# Patient Record
Sex: MALE | Race: OTHER | Employment: FULL TIME | URBAN - METROPOLITAN AREA
[De-identification: names, ages, dates, MRNs, and addresses within clinical notes are randomized per-mention and may not be internally consistent; named-entity substitution may affect disease eponyms.]

---

## 2017-03-22 PROCEDURE — 88305 TISSUE EXAM BY PATHOLOGIST: CPT | Performed by: INTERNAL MEDICINE

## 2017-03-23 PROBLEM — Z86.0100 HISTORY OF COLON POLYPS: Status: ACTIVE | Noted: 2017-03-23

## 2017-03-23 PROBLEM — Z86.010 HISTORY OF COLON POLYPS: Status: ACTIVE | Noted: 2017-03-23

## 2017-04-10 PROBLEM — H25.13 NUCLEAR SCLEROTIC CATARACT, BILATERAL: Status: ACTIVE | Noted: 2017-04-10

## 2017-04-10 PROBLEM — H17.9 CORNEAL SCARS, BOTH EYES: Status: ACTIVE | Noted: 2017-04-10

## 2017-04-10 PROBLEM — H26.9 CORTICAL CATARACT OF BOTH EYES: Status: ACTIVE | Noted: 2017-04-10

## 2018-02-13 PROBLEM — E66.3 OVERWEIGHT (BMI 25.0-29.9): Status: ACTIVE | Noted: 2018-02-13

## 2018-02-13 PROCEDURE — 84153 ASSAY OF PSA TOTAL: CPT | Performed by: INTERNAL MEDICINE

## 2018-06-10 PROCEDURE — 87086 URINE CULTURE/COLONY COUNT: CPT | Performed by: INTERNAL MEDICINE

## 2018-07-06 PROCEDURE — 87086 URINE CULTURE/COLONY COUNT: CPT | Performed by: INTERNAL MEDICINE

## 2018-07-30 PROCEDURE — 87086 URINE CULTURE/COLONY COUNT: CPT | Performed by: UROLOGY

## 2018-08-21 PROCEDURE — 88305 TISSUE EXAM BY PATHOLOGIST: CPT | Performed by: UROLOGY

## 2018-09-16 ENCOUNTER — APPOINTMENT (OUTPATIENT)
Dept: CT IMAGING | Facility: HOSPITAL | Age: 63
End: 2018-09-16
Attending: EMERGENCY MEDICINE
Payer: COMMERCIAL

## 2018-09-16 ENCOUNTER — HOSPITAL ENCOUNTER (OUTPATIENT)
Facility: HOSPITAL | Age: 63
Setting detail: OBSERVATION
Discharge: HOME | End: 2018-09-17
Attending: EMERGENCY MEDICINE | Admitting: INTERNAL MEDICINE
Payer: COMMERCIAL

## 2018-09-16 DIAGNOSIS — R31.9 HEMATURIA, UNSPECIFIED TYPE: Primary | ICD-10-CM

## 2018-09-16 PROBLEM — R73.9 HYPERGLYCEMIA: Status: ACTIVE | Noted: 2018-09-16

## 2018-09-16 LAB
ALBUMIN SERPL-MCNC: 3.7 G/DL (ref 3.5–4.8)
ALBUMIN/GLOB SERPL: 0.9 {RATIO} (ref 1–2)
ALP LIVER SERPL-CCNC: 79 U/L (ref 45–117)
ALT SERPL-CCNC: 18 U/L (ref 17–63)
ANION GAP SERPL CALC-SCNC: 6 MMOL/L (ref 0–18)
AST SERPL-CCNC: 13 U/L (ref 15–41)
BASOPHILS # BLD AUTO: 0.05 X10(3) UL (ref 0–0.1)
BASOPHILS NFR BLD AUTO: 0.5 %
BILIRUB SERPL-MCNC: 0.4 MG/DL (ref 0.1–2)
BUN BLD-MCNC: 13 MG/DL (ref 8–20)
BUN/CREAT SERPL: 13.1 (ref 10–20)
CALCIUM BLD-MCNC: 8.8 MG/DL (ref 8.3–10.3)
CHLORIDE SERPL-SCNC: 109 MMOL/L (ref 101–111)
CO2 SERPL-SCNC: 24 MMOL/L (ref 22–32)
CREAT BLD-MCNC: 0.99 MG/DL (ref 0.7–1.3)
EOSINOPHIL # BLD AUTO: 0.07 X10(3) UL (ref 0–0.3)
EOSINOPHIL NFR BLD AUTO: 0.8 %
ERYTHROCYTE [DISTWIDTH] IN BLOOD BY AUTOMATED COUNT: 13.2 % (ref 11.5–16)
GLOBULIN PLAS-MCNC: 3.9 G/DL (ref 2.5–4)
GLUCOSE BLD-MCNC: 101 MG/DL (ref 70–99)
GLUCOSE UR STRIP.AUTO-MCNC: NEGATIVE MG/DL
HCT VFR BLD AUTO: 41.8 % (ref 37–53)
HGB BLD-MCNC: 14.5 G/DL (ref 13–17)
IMMATURE GRANULOCYTE COUNT: 0.06 X10(3) UL (ref 0–1)
IMMATURE GRANULOCYTE RATIO %: 0.7 %
LYMPHOCYTES # BLD AUTO: 1.76 X10(3) UL (ref 0.9–4)
LYMPHOCYTES NFR BLD AUTO: 19.2 %
M PROTEIN MFR SERPL ELPH: 7.6 G/DL (ref 6.1–8.3)
MCH RBC QN AUTO: 29.9 PG (ref 27–33.2)
MCHC RBC AUTO-ENTMCNC: 34.7 G/DL (ref 31–37)
MCV RBC AUTO: 86.2 FL (ref 80–99)
MONOCYTES # BLD AUTO: 0.53 X10(3) UL (ref 0.1–1)
MONOCYTES NFR BLD AUTO: 5.8 %
NEUTROPHIL ABS PRELIM: 6.71 X10 (3) UL (ref 1.3–6.7)
NEUTROPHILS # BLD AUTO: 6.71 X10(3) UL (ref 1.3–6.7)
NEUTROPHILS NFR BLD AUTO: 73 %
NITRITE UR QL STRIP.AUTO: POSITIVE
OSMOLALITY SERPL CALC.SUM OF ELEC: 288 MOSM/KG (ref 275–295)
PH UR STRIP.AUTO: 5.5 [PH] (ref 4.5–8)
PLATELET # BLD AUTO: 366 10(3)UL (ref 150–450)
POTASSIUM SERPL-SCNC: 4.1 MMOL/L (ref 3.6–5.1)
PROT UR STRIP.AUTO-MCNC: >=300 MG/DL
RBC # BLD AUTO: 4.85 X10(6)UL (ref 3.8–5.8)
RBC #/AREA URNS AUTO: >10 /HPF
RED CELL DISTRIBUTION WIDTH-SD: 40.7 FL (ref 35.1–46.3)
SODIUM SERPL-SCNC: 139 MMOL/L (ref 136–144)
SP GR UR STRIP.AUTO: 1.01 (ref 1–1.03)
UROBILINOGEN UR STRIP.AUTO-MCNC: 1 MG/DL
WBC # BLD AUTO: 9.2 X10(3) UL (ref 4–13)

## 2018-09-16 PROCEDURE — 80053 COMPREHEN METABOLIC PANEL: CPT | Performed by: EMERGENCY MEDICINE

## 2018-09-16 PROCEDURE — 81001 URINALYSIS AUTO W/SCOPE: CPT | Performed by: EMERGENCY MEDICINE

## 2018-09-16 PROCEDURE — 87086 URINE CULTURE/COLONY COUNT: CPT | Performed by: EMERGENCY MEDICINE

## 2018-09-16 PROCEDURE — 96361 HYDRATE IV INFUSION ADD-ON: CPT

## 2018-09-16 PROCEDURE — 96365 THER/PROPH/DIAG IV INF INIT: CPT

## 2018-09-16 PROCEDURE — 51700 IRRIGATION OF BLADDER: CPT

## 2018-09-16 PROCEDURE — 99285 EMERGENCY DEPT VISIT HI MDM: CPT

## 2018-09-16 PROCEDURE — 74176 CT ABD & PELVIS W/O CONTRAST: CPT | Performed by: EMERGENCY MEDICINE

## 2018-09-16 PROCEDURE — 85025 COMPLETE CBC W/AUTO DIFF WBC: CPT | Performed by: EMERGENCY MEDICINE

## 2018-09-16 RX ORDER — SODIUM CHLORIDE 9 MG/ML
INJECTION, SOLUTION INTRAVENOUS CONTINUOUS
Status: ACTIVE | OUTPATIENT
Start: 2018-09-16 | End: 2018-09-16

## 2018-09-16 RX ORDER — LIDOCAINE HYDROCHLORIDE 20 MG/ML
10 JELLY TOPICAL ONCE
Status: COMPLETED | OUTPATIENT
Start: 2018-09-16 | End: 2018-09-16

## 2018-09-16 RX ORDER — HYDROMORPHONE HYDROCHLORIDE 1 MG/ML
0.5 INJECTION, SOLUTION INTRAMUSCULAR; INTRAVENOUS; SUBCUTANEOUS EVERY 30 MIN PRN
Status: ACTIVE | OUTPATIENT
Start: 2018-09-16 | End: 2018-09-16

## 2018-09-16 RX ORDER — HYDROCODONE BITARTRATE AND ACETAMINOPHEN 5; 325 MG/1; MG/1
1 TABLET ORAL EVERY 6 HOURS PRN
COMMUNITY
End: 2019-01-12 | Stop reason: ALTCHOICE

## 2018-09-16 RX ORDER — ACETAMINOPHEN 325 MG/1
650 TABLET ORAL EVERY 6 HOURS PRN
Status: DISCONTINUED | OUTPATIENT
Start: 2018-09-16 | End: 2018-09-17

## 2018-09-16 RX ORDER — ONDANSETRON 2 MG/ML
4 INJECTION INTRAMUSCULAR; INTRAVENOUS EVERY 4 HOURS PRN
Status: DISCONTINUED | OUTPATIENT
Start: 2018-09-16 | End: 2018-09-17

## 2018-09-16 RX ORDER — SODIUM CHLORIDE 9 MG/ML
INJECTION, SOLUTION INTRAVENOUS CONTINUOUS
Status: DISCONTINUED | OUTPATIENT
Start: 2018-09-16 | End: 2018-09-17

## 2018-09-16 RX ORDER — MAGNESIUM OXIDE 400 MG (241.3 MG MAGNESIUM) TABLET
1 TABLET NIGHTLY
Status: DISCONTINUED | OUTPATIENT
Start: 2018-09-16 | End: 2018-09-17

## 2018-09-16 NOTE — ED INITIAL ASSESSMENT (HPI)
Pt presents to ER with blood in urine. Pt states passed a clot. Pt has also noted left flank pain. No fever. Yes burning with urination. Pt states when pain started he felt a \"pop\" inside. Pt is speaking clearly. No abd pain. No n/v/d. Pt is a&ox3.  Skin

## 2018-09-16 NOTE — PLAN OF CARE
Received patient from ER at [phone]. Patient A&OX4. Room air. VSS. Denies pain. Ingram with CBI ongoing, urine clear. POC updated. Verbalized understanding. Will continue to monitor. Call light within reach.

## 2018-09-16 NOTE — CONSULTS
[facility] Dosing Service  Antibiotic Dosing    Raj Gómez is a 70year old male for whom pharmacy is dosing Ceftriaxone for treatment of  UTI. .  Other antibiotics (Not dosed by pharmacy): none    Allergies: has No Known Allergies.     Vitals: /

## 2018-09-16 NOTE — ED PROVIDER NOTES
Patient Seen in: [facility] Emergency Department    History   Patient presents with:  Urinary Symptoms (urologic)  Bleeding (hematologic)    Stated Complaint: urinary problems, prostate surgery 8/21, bleeding    HPI    This is a 43-year-old male who oz    Drug use: No      Review of Systems    Positive for stated complaint: urinary problems, prostate surgery 8/21, bleeding  Other systems are as noted in HPI. Constitutional and vital signs reviewed.       All other systems reviewed and negative except components within normal limits   COMP METABOLIC PANEL (14) - Abnormal; Notable for the following components:    Glucose 101 (*)     AST 13 (*)     A/G Ratio 0.9 (*)     All other components within normal limits   CBC W/ DIFFERENTIAL - Abnormal; Notable fo Registry. PATIENT STATED HISTORY: (As transcribed by Technologist)  Patient had frequency and blood in urine for 2 days after a prostate surgy on 8/21. FINDINGS:  KIDNEYS:  No calculi in the kidneys. Renal collecting systems are not dilated.  ADRENALS: The patient was admitted for further evaluation treatment    Disposition and Plan     Clinical Impression:  Hematuria, unspecified type  (primary encounter diagnosis)  Bladder hematoma  Disposition:  There is no disposition on file for this visit.   There i

## 2018-09-17 VITALS
RESPIRATION RATE: 18 BRPM | WEIGHT: 185 LBS | DIASTOLIC BLOOD PRESSURE: 78 MMHG | OXYGEN SATURATION: 100 % | SYSTOLIC BLOOD PRESSURE: 120 MMHG | HEART RATE: 78 BPM | TEMPERATURE: 98 F | HEIGHT: 66 IN | BODY MASS INDEX: 29.73 KG/M2

## 2018-09-17 LAB
ANION GAP SERPL CALC-SCNC: 8 MMOL/L (ref 0–18)
BASOPHILS # BLD AUTO: 0.05 X10(3) UL (ref 0–0.1)
BASOPHILS NFR BLD AUTO: 0.7 %
BUN BLD-MCNC: 11 MG/DL (ref 8–20)
BUN/CREAT SERPL: 11.8 (ref 10–20)
CALCIUM BLD-MCNC: 8.5 MG/DL (ref 8.3–10.3)
CHLORIDE SERPL-SCNC: 111 MMOL/L (ref 101–111)
CO2 SERPL-SCNC: 23 MMOL/L (ref 22–32)
CREAT BLD-MCNC: 0.93 MG/DL (ref 0.7–1.3)
EOSINOPHIL # BLD AUTO: 0.23 X10(3) UL (ref 0–0.3)
EOSINOPHIL NFR BLD AUTO: 3 %
ERYTHROCYTE [DISTWIDTH] IN BLOOD BY AUTOMATED COUNT: 13.3 % (ref 11.5–16)
GLUCOSE BLD-MCNC: 105 MG/DL (ref 70–99)
HCT VFR BLD AUTO: 37.5 % (ref 37–53)
HGB BLD-MCNC: 12.7 G/DL (ref 13–17)
IMMATURE GRANULOCYTE COUNT: 0.05 X10(3) UL (ref 0–1)
IMMATURE GRANULOCYTE RATIO %: 0.7 %
LYMPHOCYTES # BLD AUTO: 2.08 X10(3) UL (ref 0.9–4)
LYMPHOCYTES NFR BLD AUTO: 27.2 %
MCH RBC QN AUTO: 29.5 PG (ref 27–33.2)
MCHC RBC AUTO-ENTMCNC: 33.9 G/DL (ref 31–37)
MCV RBC AUTO: 87 FL (ref 80–99)
MONOCYTES # BLD AUTO: 0.52 X10(3) UL (ref 0.1–1)
MONOCYTES NFR BLD AUTO: 6.8 %
NEUTROPHIL ABS PRELIM: 4.71 X10 (3) UL (ref 1.3–6.7)
NEUTROPHILS # BLD AUTO: 4.71 X10(3) UL (ref 1.3–6.7)
NEUTROPHILS NFR BLD AUTO: 61.6 %
OSMOLALITY SERPL CALC.SUM OF ELEC: 294 MOSM/KG (ref 275–295)
PLATELET # BLD AUTO: 307 10(3)UL (ref 150–450)
POTASSIUM SERPL-SCNC: 4.2 MMOL/L (ref 3.6–5.1)
RBC # BLD AUTO: 4.31 X10(6)UL (ref 3.8–5.8)
RED CELL DISTRIBUTION WIDTH-SD: 42.5 FL (ref 35.1–46.3)
SODIUM SERPL-SCNC: 142 MMOL/L (ref 136–144)
WBC # BLD AUTO: 7.6 X10(3) UL (ref 4–13)

## 2018-09-17 PROCEDURE — 85025 COMPLETE CBC W/AUTO DIFF WBC: CPT | Performed by: HOSPITALIST

## 2018-09-17 PROCEDURE — 80048 BASIC METABOLIC PNL TOTAL CA: CPT | Performed by: HOSPITALIST

## 2018-09-17 RX ORDER — CEFUROXIME AXETIL 500 MG/1
500 TABLET ORAL 2 TIMES DAILY
Qty: 20 TABLET | Refills: 0 | Status: SHIPPED | OUTPATIENT
Start: 2018-09-17 | End: 2018-09-27

## 2018-09-17 NOTE — CONSULTS
[facility] Urology  Consultation Note    Thania Zaldivar Patient Status:  Observation    1955 MRN [id number]   [facility] 3NW-A Attending Lenore Kolb MD   Hosp Day # 0 PCP Rocky Judd MD     Reason for Consultation: Onset   • Diabetes Mother    • Cancer Father         bone CA   • Heart Disorder Paternal Grandfather       reports that  has never smoked. he has never used smokeless tobacco. He reports that he does not drink alcohol or use drugs.     Allergies:  No Known Large 09/16/2018    PHURINE 5.5 09/16/2018    PROUR >=300 09/16/2018    UROBILINOGEN 1.0 09/16/2018    NITRITE Positive 09/16/2018    LEUUR Large 09/16/2018       Impression/Plan:  Gross hematuria - etiology possibly related to UTI vs recent TURP for BPH

## 2018-09-17 NOTE — DISCHARGE SUMMARY
General Medicine Discharge Summary     Patient ID:  Bravo Augustine  70year old  4/22/1955    Admit date: 9/16/2018    Discharge date and time: 9/17/2018  3:44 PM     Attending Physician: Grupo Saini MD    Primary Care Physician: MD [facility] therapeutic plan as outlined above.      Thank Martha Lopez MD  [facility]

## 2018-09-17 NOTE — PLAN OF CARE
GENITOURINARY - ADULT    • Absence of urinary retention Adequate for Discharge        HEMATOLOGIC - ADULT    • Maintains hematologic stability Adequate for Discharge    • Free from bleeding injury Adequate for Discharge        Patient/Family Goals    • Juliana Thompson

## 2018-09-17 NOTE — PROGRESS NOTES
Dr Stephanie Hilario at bedside to see patient. Manually irrigated 3 way osorio with writing RN assisting. Patient tolerated well. Small to moderate amount of clots removed.

## 2018-09-17 NOTE — PAYOR COMM NOTE
--------------  ADMISSION REVIEW     Payor: [address] [employer]  Subscriber #:  [id number]  Authorization Number: [id number]    Admit date: 9/16/18      Admitting Physician: Marii Villavicencio MD  Attending Physician:  Marii Villavicencio MD  Primary Car Senia  11/10/15: OTHER SURGICAL HISTORY      Comment:  Prostate Biopsy- Dr. Fabian Ortega      Review of Systems    Positive for stated complaint: urinary problems, prostate surgery 8/21, bleeding  Other systems are as noted in HPI.   Constitutional and vital signs Bacteria Urine Rare (*)     Mucous Urine 1+ (*)     All other components within normal limits   COMP METABOLIC PANEL (14) - Abnormal; Notable for the following components:    Glucose 101 (*)     AST 13 (*)     A/G Ratio 0.9 (*)     All other components wit diagnosis)  Bladder hematoma    [facility] hospitalist H+P  PCP Brigida Ingram MD  CC blood in Urine  HPI 69 yo male with hx of BPH with LUTS s/p Cystoscopy, Transurethral Resection of Prostate on 8/21/18 presents with hematuria.    Currently no pain, pain quantity,    Catheter was irrigated manually at bed side, minimal clots.      Impression/Plan:  Gross hematuria - etiology possibly related to UTI vs recent TURP for BPH   CBI in place, urine light clear - will titrate and possibly DC tomorrow   Urine culture - pe

## 2018-11-06 ENCOUNTER — APPOINTMENT (OUTPATIENT)
Dept: GENERAL RADIOLOGY | Facility: HOSPITAL | Age: 63
End: 2018-11-06
Attending: EMERGENCY MEDICINE
Payer: COMMERCIAL

## 2018-11-06 PROCEDURE — 70360 X-RAY EXAM OF NECK: CPT | Performed by: EMERGENCY MEDICINE

## 2018-11-06 PROCEDURE — 71045 X-RAY EXAM CHEST 1 VIEW: CPT | Performed by: EMERGENCY MEDICINE

## 2018-11-06 NOTE — ED INITIAL ASSESSMENT (HPI)
Pt reports anxiety/panic attack since 2300 last night, pt pacing in waiting room, pt states if he tried to lay down he wouldn't be able to breathe.

## 2018-11-06 NOTE — ED PROVIDER NOTES
Patient Seen in: [facility] Emergency Department    History   Patient presents with: Anxiety/Panic attack (neurologic)    Stated Complaint: pt states he feels very anxious since 11pm, anxious walking and pacing.  pt stat*    Patient is a 76-year-old Prostate Biopsy- Dr. Angelita Mart   • OTHER SURGICAL HISTORY  08/21/2018    TURP           Social History    Tobacco Use      Smoking status: Never Smoker      Smokeless tobacco: Never Used    Alcohol use: No      Alcohol/week: 0.0 oz    Drug use: No      Revi Value    Glucose 103 (*)     AST 13 (*)     A/G Ratio 0.9 (*)     All other components within normal limits   TROPONIN I - Normal   CBC WITH DIFFERENTIAL WITH PLATELET    Narrative:      The following orders were created for panel order CBC WITH DIFFERENTIA follow-up with her primary care provider for further evaluation and treatment, but to return immediately to the ER for worsening, concerning, new, changing or persisting symptoms.   I discussed the case with the patient and they had no questions, complaints

## 2019-07-05 PROBLEM — F41.9 ANXIETY: Status: ACTIVE | Noted: 2019-07-05

## 2021-08-31 PROBLEM — F41.9 ANXIETY: Status: RESOLVED | Noted: 2019-07-05 | Resolved: 2021-08-31

## 2021-08-31 PROBLEM — H25.13 NUCLEAR SCLEROTIC CATARACT, BILATERAL: Status: RESOLVED | Noted: 2017-04-10 | Resolved: 2021-08-31

## 2021-08-31 PROBLEM — H17.9 CORNEAL SCARS, BOTH EYES: Status: RESOLVED | Noted: 2017-04-10 | Resolved: 2021-08-31

## 2021-08-31 PROBLEM — H26.9 CORTICAL CATARACT OF BOTH EYES: Status: RESOLVED | Noted: 2017-04-10 | Resolved: 2021-08-31

## 2021-08-31 PROBLEM — R31.9 HEMATURIA: Status: RESOLVED | Noted: 2018-09-16 | Resolved: 2021-08-31

## 2022-12-01 ENCOUNTER — IMAGING SERVICES (OUTPATIENT)
Dept: GENERAL RADIOLOGY | Age: 67
End: 2022-12-01
Attending: PHYSICIAN ASSISTANT

## 2022-12-01 DIAGNOSIS — S00.83XA CONTUSION OF FACE, INITIAL ENCOUNTER: Primary | ICD-10-CM

## 2022-12-01 DIAGNOSIS — S00.83XA CONTUSION OF FACE, INITIAL ENCOUNTER: ICD-10-CM

## 2022-12-01 PROCEDURE — 70150 X-RAY EXAM OF FACIAL BONES: CPT | Performed by: RADIOLOGY

## 2025-08-01 ENCOUNTER — HOSPITAL ENCOUNTER (EMERGENCY)
Facility: HOSPITAL | Age: 70
Discharge: HOME | End: 2025-08-01
Attending: EMERGENCY MEDICINE

## 2025-08-01 VITALS
OXYGEN SATURATION: 100 % | WEIGHT: 180 LBS | HEIGHT: 66 IN | TEMPERATURE: 98 F | SYSTOLIC BLOOD PRESSURE: 148 MMHG | RESPIRATION RATE: 17 BRPM | DIASTOLIC BLOOD PRESSURE: 78 MMHG | HEART RATE: 85 BPM | BODY MASS INDEX: 28.93 KG/M2

## 2025-08-01 DIAGNOSIS — S81.812A LACERATION OF LEFT LOWER EXTREMITY, INITIAL ENCOUNTER: Primary | ICD-10-CM

## 2025-08-01 PROCEDURE — 12002 RPR S/N/AX/GEN/TRNK2.6-7.5CM: CPT

## 2025-08-01 PROCEDURE — 99283 EMERGENCY DEPT VISIT LOW MDM: CPT

## 2025-08-01 RX ORDER — CLINDAMYCIN HYDROCHLORIDE 300 MG/1
300 CAPSULE ORAL 4 TIMES DAILY
Qty: 28 CAPSULE | Refills: 0 | Status: SHIPPED | OUTPATIENT
Start: 2025-08-01 | End: 2025-08-08

## 2025-08-01 RX ORDER — NAPROXEN 500 MG/1
500 TABLET ORAL 2 TIMES DAILY PRN
Qty: 10 TABLET | Refills: 0 | Status: SHIPPED | OUTPATIENT
Start: 2025-08-01 | End: 2025-08-06

## (undated) NOTE — ED AVS SNAPSHOT
Concepcion Zapata   MRN: [id number]    Department:  [facility] Emergency Department   Date of Visit:  11/6/2018           Disclosure     Insurance plans vary and the physician(s) referred by the ER may not be covered by your plan.  Please contact your tell this physician (or your personal doctor if your instructions are to return to your personal doctor) about any new or lasting problems. The primary care or specialist physician will see patients referred from the [facility] Emergency Department.  Michelle Trevizo